# Patient Record
Sex: FEMALE | Race: OTHER | Employment: OTHER
[De-identification: names, ages, dates, MRNs, and addresses within clinical notes are randomized per-mention and may not be internally consistent; named-entity substitution may affect disease eponyms.]

---

## 2023-05-24 ENCOUNTER — APPOINTMENT (OUTPATIENT)
Facility: HOSPITAL | Age: 23
End: 2023-05-24
Payer: OTHER GOVERNMENT

## 2023-05-24 ENCOUNTER — HOSPITAL ENCOUNTER (INPATIENT)
Facility: HOSPITAL | Age: 23
LOS: 2 days | Discharge: HOME | End: 2023-05-26
Attending: EMERGENCY MEDICINE | Admitting: FAMILY MEDICINE
Payer: OTHER GOVERNMENT

## 2023-05-24 DIAGNOSIS — H46.9 OPTIC NEURITIS: Primary | ICD-10-CM

## 2023-05-24 PROBLEM — H54.62 VISION LOSS OF LEFT EYE: Status: ACTIVE | Noted: 2023-05-24

## 2023-05-24 LAB
ALBUMIN SERPL-MCNC: 4 G/DL (ref 3.5–5)
ALBUMIN/GLOB SERPL: 1.2 (ref 1.1–2.2)
ALP SERPL-CCNC: 48 U/L (ref 45–117)
ALT SERPL-CCNC: 18 U/L (ref 12–78)
ANION GAP SERPL CALC-SCNC: 5 MMOL/L (ref 5–15)
AST SERPL-CCNC: 9 U/L (ref 15–37)
BILIRUB SERPL-MCNC: 0.1 MG/DL (ref 0.2–1)
BUN SERPL-MCNC: 13 MG/DL (ref 6–20)
BUN/CREAT SERPL: 18 (ref 12–20)
CALCIUM SERPL-MCNC: 8.9 MG/DL (ref 8.5–10.1)
CHLORIDE SERPL-SCNC: 108 MMOL/L (ref 97–108)
CO2 SERPL-SCNC: 26 MMOL/L (ref 21–32)
COMMENT:: NORMAL
CREAT SERPL-MCNC: 0.73 MG/DL (ref 0.55–1.02)
ERYTHROCYTE [DISTWIDTH] IN BLOOD BY AUTOMATED COUNT: 12.6 % (ref 11.5–14.5)
GLOBULIN SER CALC-MCNC: 3.3 G/DL (ref 2–4)
GLUCOSE SERPL-MCNC: 96 MG/DL (ref 65–100)
HCG SERPL QL: NEGATIVE
HCG UR QL: NEGATIVE
HCT VFR BLD AUTO: 41.2 % (ref 35–47)
HGB BLD-MCNC: 13.4 G/DL (ref 11.5–16)
MCH RBC QN AUTO: 30 PG (ref 26–34)
MCHC RBC AUTO-ENTMCNC: 32.5 G/DL (ref 30–36.5)
MCV RBC AUTO: 92.2 FL (ref 80–99)
NRBC # BLD: 0 K/UL (ref 0–0.01)
NRBC BLD-RTO: 0 PER 100 WBC
PLATELET # BLD AUTO: 313 K/UL (ref 150–400)
PMV BLD AUTO: 9.2 FL (ref 8.9–12.9)
POTASSIUM SERPL-SCNC: 3.8 MMOL/L (ref 3.5–5.1)
PROT SERPL-MCNC: 7.3 G/DL (ref 6.4–8.2)
RBC # BLD AUTO: 4.47 M/UL (ref 3.8–5.2)
SODIUM SERPL-SCNC: 139 MMOL/L (ref 136–145)
SPECIMEN HOLD: NORMAL
WBC # BLD AUTO: 7.4 K/UL (ref 3.6–11)

## 2023-05-24 PROCEDURE — 85027 COMPLETE CBC AUTOMATED: CPT

## 2023-05-24 PROCEDURE — 99285 EMERGENCY DEPT VISIT HI MDM: CPT

## 2023-05-24 PROCEDURE — 6360000004 HC RX CONTRAST MEDICATION

## 2023-05-24 PROCEDURE — 96374 THER/PROPH/DIAG INJ IV PUSH: CPT

## 2023-05-24 PROCEDURE — 36415 COLL VENOUS BLD VENIPUNCTURE: CPT

## 2023-05-24 PROCEDURE — 80053 COMPREHEN METABOLIC PANEL: CPT

## 2023-05-24 PROCEDURE — 1100000003 HC PRIVATE W/ TELEMETRY

## 2023-05-24 PROCEDURE — 2580000003 HC RX 258: Performed by: EMERGENCY MEDICINE

## 2023-05-24 PROCEDURE — 70553 MRI BRAIN STEM W/O & W/DYE: CPT

## 2023-05-24 PROCEDURE — 6360000002 HC RX W HCPCS: Performed by: EMERGENCY MEDICINE

## 2023-05-24 PROCEDURE — 84703 CHORIONIC GONADOTROPIN ASSAY: CPT

## 2023-05-24 PROCEDURE — 81025 URINE PREGNANCY TEST: CPT

## 2023-05-24 PROCEDURE — A9579 GAD-BASE MR CONTRAST NOS,1ML: HCPCS

## 2023-05-24 RX ORDER — POLYETHYLENE GLYCOL 3350 17 G/17G
17 POWDER, FOR SOLUTION ORAL DAILY PRN
Status: DISCONTINUED | OUTPATIENT
Start: 2023-05-24 | End: 2023-05-27 | Stop reason: HOSPADM

## 2023-05-24 RX ORDER — ONDANSETRON 4 MG/1
4 TABLET, ORALLY DISINTEGRATING ORAL EVERY 8 HOURS PRN
Status: DISCONTINUED | OUTPATIENT
Start: 2023-05-24 | End: 2023-05-27 | Stop reason: HOSPADM

## 2023-05-24 RX ORDER — ENOXAPARIN SODIUM 100 MG/ML
40 INJECTION SUBCUTANEOUS DAILY
Status: DISCONTINUED | OUTPATIENT
Start: 2023-05-25 | End: 2023-05-24

## 2023-05-24 RX ORDER — ONDANSETRON 2 MG/ML
4 INJECTION INTRAMUSCULAR; INTRAVENOUS EVERY 6 HOURS PRN
Status: DISCONTINUED | OUTPATIENT
Start: 2023-05-24 | End: 2023-05-27 | Stop reason: HOSPADM

## 2023-05-24 RX ADMIN — GADOTERIDOL 10 ML: 279.3 INJECTION, SOLUTION INTRAVENOUS at 21:03

## 2023-05-24 RX ADMIN — SODIUM CHLORIDE 1000 MG: 900 INJECTION INTRAVENOUS at 21:53

## 2023-05-24 ASSESSMENT — ENCOUNTER SYMPTOMS
NAUSEA: 0
SHORTNESS OF BREATH: 0
DIARRHEA: 0
ABDOMINAL PAIN: 0
COUGH: 0
ABDOMINAL DISTENTION: 0
ANAL BLEEDING: 0
VOMITING: 0
BLOOD IN STOOL: 0

## 2023-05-24 ASSESSMENT — PAIN - FUNCTIONAL ASSESSMENT: PAIN_FUNCTIONAL_ASSESSMENT: 0-10

## 2023-05-24 ASSESSMENT — PAIN SCALES - GENERAL
PAINLEVEL_OUTOF10: 6
PAINLEVEL_OUTOF10: 7

## 2023-05-24 ASSESSMENT — PAIN DESCRIPTION - DESCRIPTORS: DESCRIPTORS: ACHING

## 2023-05-24 ASSESSMENT — PAIN DESCRIPTION - LOCATION
LOCATION: HEAD
LOCATION: HEAD

## 2023-05-24 NOTE — ED TRIAGE NOTES
Patient presents from home with complaints of migraines and vision loss out of her left eye for the last 3 weeks.  Patient was seen at the [facility] and sent here

## 2023-05-24 NOTE — ED PROVIDER NOTES
[facility] [place] EM[facility]GENCY DEP  EMERGENCY DEPARTMENT ENCOUNTER      Pt Name: Shelton Carroll  MRN: [id number]  Kellygfconnie 2000  Date of evaluation: 5/24/2023  Provider: Héctor Sprague MD    CHIEF COMPLAINT       Chief Complaint   Patient presents with    Loss of Vision         HISTORY OF PRESENT ILLNESS   (Location/Symptom, Timing/Onset, Context/Setting, Quality, Duration, Modifying Factors, Severity)  Note limiting factors. 23F w/ no known pmhx p/w 3wks HA and vision changes. Pt sent to ED after seeing [facility] today and c/f optic neuritis. Pt reports 3wks intermittent left sided HA and 2wks of left eye pain and vision changes. She initially reports near complete left eye vision loss that significantly improved since then but not yet returned to baseline. Had full opthalmology evaluation today and sent to ED c/f new onset MS and left optic neuritis w/ specific recommendations. Pt now denies any HA. No F/C, cough, dyspnea. No N/V/D. No speech changes, difficulty breathing/swallowing. No ext weakness or sensory changes or gait problems. Review of External Medical Records:     Nursing Notes were reviewed. REVIEW OF SYSTEMS    (2-9 systems for level 4, 10 or more for level 5)     Review of Systems   Constitutional:  Negative for diaphoresis and fever. HENT:  Negative for nosebleeds. Eyes:  Positive for visual disturbance. Respiratory:  Negative for cough and shortness of breath. Cardiovascular:  Negative for chest pain, palpitations and leg swelling. Gastrointestinal:  Negative for abdominal distention, abdominal pain, anal bleeding, blood in stool, diarrhea, nausea and vomiting. Endocrine: Negative for polyuria. Genitourinary:  Negative for difficulty urinating, dysuria, frequency and hematuria. Musculoskeletal:  Negative for joint swelling. Skin:  Negative for wound. Allergic/Immunologic: Negative for immunocompromised state. Neurological:  Positive for headaches.

## 2023-05-25 PROBLEM — H46.9 OPTIC NEURITIS: Status: ACTIVE | Noted: 2023-05-25

## 2023-05-25 LAB
CHOLEST SERPL-MCNC: 151 MG/DL
COMMENT:: NORMAL
CRP SERPL-MCNC: <0.29 MG/DL (ref 0–0.6)
ERYTHROCYTE [DISTWIDTH] IN BLOOD BY AUTOMATED COUNT: 12.5 % (ref 11.5–14.5)
ERYTHROCYTE [SEDIMENTATION RATE] IN BLOOD: 7 MM/HR (ref 0–20)
EST. AVERAGE GLUCOSE BLD GHB EST-MCNC: 91 MG/DL
HBA1C MFR BLD: 4.8 % (ref 4–5.6)
HCT VFR BLD AUTO: 39.7 % (ref 35–47)
HDLC SERPL-MCNC: 54 MG/DL
HDLC SERPL: 2.8 (ref 0–5)
HGB BLD-MCNC: 13.1 G/DL (ref 11.5–16)
LDLC SERPL CALC-MCNC: 90.8 MG/DL (ref 0–100)
MCH RBC QN AUTO: 29.9 PG (ref 26–34)
MCHC RBC AUTO-ENTMCNC: 33 G/DL (ref 30–36.5)
MCV RBC AUTO: 90.6 FL (ref 80–99)
NRBC # BLD: 0 K/UL (ref 0–0.01)
NRBC BLD-RTO: 0 PER 100 WBC
PLATELET # BLD AUTO: 291 K/UL (ref 150–400)
PMV BLD AUTO: 9.5 FL (ref 8.9–12.9)
RBC # BLD AUTO: 4.38 M/UL (ref 3.8–5.2)
SPECIMEN HOLD: NORMAL
TRIGL SERPL-MCNC: 31 MG/DL
VLDLC SERPL CALC-MCNC: 6.2 MG/DL
WBC # BLD AUTO: 12.3 K/UL (ref 3.6–11)

## 2023-05-25 PROCEDURE — 97161 PT EVAL LOW COMPLEX 20 MIN: CPT

## 2023-05-25 PROCEDURE — 83516 IMMUNOASSAY NONANTIBODY: CPT

## 2023-05-25 PROCEDURE — 99254 IP/OBS CNSLTJ NEW/EST MOD 60: CPT | Performed by: PSYCHIATRY & NEUROLOGY

## 2023-05-25 PROCEDURE — 6360000002 HC RX W HCPCS: Performed by: FAMILY MEDICINE

## 2023-05-25 PROCEDURE — 83036 HEMOGLOBIN GLYCOSYLATED A1C: CPT

## 2023-05-25 PROCEDURE — 86225 DNA ANTIBODY NATIVE: CPT

## 2023-05-25 PROCEDURE — 85027 COMPLETE CBC AUTOMATED: CPT

## 2023-05-25 PROCEDURE — 97165 OT EVAL LOW COMPLEX 30 MIN: CPT

## 2023-05-25 PROCEDURE — 85652 RBC SED RATE AUTOMATED: CPT

## 2023-05-25 PROCEDURE — 86235 NUCLEAR ANTIGEN ANTIBODY: CPT

## 2023-05-25 PROCEDURE — 80061 LIPID PANEL: CPT

## 2023-05-25 PROCEDURE — 2580000003 HC RX 258: Performed by: FAMILY MEDICINE

## 2023-05-25 PROCEDURE — 86140 C-REACTIVE PROTEIN: CPT

## 2023-05-25 PROCEDURE — APPNB45 APP NON BILLABLE 31-45 MINUTES

## 2023-05-25 PROCEDURE — 36415 COLL VENOUS BLD VENIPUNCTURE: CPT

## 2023-05-25 PROCEDURE — 1100000003 HC PRIVATE W/ TELEMETRY

## 2023-05-25 RX ADMIN — SODIUM CHLORIDE 1000 MG: 900 INJECTION INTRAVENOUS at 21:30

## 2023-05-25 ASSESSMENT — PAIN - FUNCTIONAL ASSESSMENT
PAIN_FUNCTIONAL_ASSESSMENT: NONE - DENIES PAIN
PAIN_FUNCTIONAL_ASSESSMENT: NONE - DENIES PAIN

## 2023-05-25 ASSESSMENT — LIFESTYLE VARIABLES
HOW MANY STANDARD DRINKS CONTAINING ALCOHOL DO YOU HAVE ON A TYPICAL DAY: 1 OR 2
HOW OFTEN DO YOU HAVE A DRINK CONTAINING ALCOHOL: MONTHLY OR LESS

## 2023-05-25 ASSESSMENT — PAIN SCALES - GENERAL
PAINLEVEL_OUTOF10: 0

## 2023-05-25 NOTE — H&P
Social History:     Social Determinants of Health     Tobacco Use: Not on file   Alcohol Use: Not on file   Financial Resource Strain: Not on file   Food Insecurity: Not on file   Transportation Needs: Not on file   Physical Activity: Not on file   Stress: Not on file   Social Connections: Not on file   Intimate Partner Violence: Not on file   Depression: Not on file   Housing Stability: Not on file        Medications were reconciled to the best of my ability given all available resources at the time of admission. Route is PO if not otherwise noted. Family and social history were personally reviewed, all pertinent and relevant details are outlined as above.     Objective:   /80   Pulse 80   Temp 97.8 °F (36.6 °C) (Oral)   Resp 20   Wt 57.3 kg (126 lb 5.2 oz)   SpO2 98%         PHYSICAL EXAM:   General: Alert x oriented x 3, awake, no acute distress,   HEENT: PEERL, EOMI, moist mucus membranes  Neck: Supple, no JVD, no meningeal signs  Chest: Clear to auscultation bilaterally   CVS: RRR, S1 S2 heard, no murmurs/rubs/gallops  Abd: Soft, non-tender, non-distended, +bowel sounds   Ext: No clubbing, no cyanosis, no edema  Neuro/Psych: Pleasant mood and affect, CN 2-12 grossly intact, sensory grossly within normal limit, Strength 5/5 in all extremities, DTR 1+ x 4  Cap refill: Brisk, less than 3 seconds  Pulses: 2+, symmetric in all extremities  Skin: Warm, dry, without rashes or lesions    Data Review:   I have independently reviewed and interpreted patient's lab and all other diagnostic data    Abnormal Labs Reviewed   COMPREHENSIVE METABOLIC PANEL - Abnormal; Notable for the following components:       Result Value    Total Bilirubin 0.1 (*)     AST 9 (*)     All other components within normal limits       [unfilled]    IMAGING:   MRI BRAIN W WO CONTRAST    (Results Pending)        ECG/ECHO:  [unfilled]       Notes reviewed from all clinical/nonclinical/nursing services involved in patient's

## 2023-05-25 NOTE — ED NOTES
Report given to [facility] RN from [facility].      Morelia Camarillo RN  05/25/23 1954

## 2023-05-25 NOTE — ED NOTES
Attempt to call report, room not clean yet.      [facility]  05/25/23 0095

## 2023-05-25 NOTE — CONSULTS
NeuroInterventional Surgery Consult  [facility] - [facility]    Patient: Alix Garzon MRN: [id number]  SSN: [id number]    YOB: 2000  Age: 26 y.o. Sex: female      Chief Complaint: Left eye vision loss, headache    HPI:   26 y.o. R-H Hispanic / [address]  female w/ no significant pmh to report presented to [address] [place] [address] ED on 5/24/23 for intermittent left visual disturbance, including complete left eye vision loss as well as headache. She reports that symptoms began apprx three weeks ago. The pt is in the [employer] and is a part of the [place] [employer]. She saw an ophthalmologist at [facility] who advised that she go to the ED for treatment w/ suspicion of MS vs optic neuritis. Labs were unremarkable upon presentation; Vitals - temp 98.3F, HR 88, /84, RR 16, Sp02 97% on RA. MRI brain performed showing appearance of inflamed left optic nerve and no white matter lesions suggestive of MS. Pt was started on methylprednisolone 1000mg IV. Neurology has been consulted for evaluation of left visual disturbance and headache w/ evidence suggesting optic neuritis. No past medical history on file. Family History  Mother - Hypertension    Social History     Tobacco Use    Smoking status: Denies    Smokeless tobacco: Vaping, daily   Substance Use Topics    Alcohol use: Socially, monthly      [unfilled]    No Known Allergies    Review of Systems:  11pt ROS negative except for above    Subjective:  Pt reports improvement in left visual disturbance. She now says it appears her left vision is nearly back to baseline except for sensation like \"a scratch on my glasses\". She reports 2/10 dull posterior headache. She denies dizziness, shortness of breath, numbness or tingling, nausea, or chest pain.     Objective:     Vitals:    05/25/23 1724   BP: 136/87   Pulse: 81   Resp: 20   Temp: 98.2 °F (36.8 °C)   SpO2: 95%     Physical Exam:  GENERAL: Calm, cooperative, NAD  SKIN: Warm, dry, color

## 2023-05-26 PROCEDURE — 2580000003 HC RX 258: Performed by: FAMILY MEDICINE

## 2023-05-26 PROCEDURE — 1100000003 HC PRIVATE W/ TELEMETRY

## 2023-05-26 PROCEDURE — 6360000002 HC RX W HCPCS: Performed by: FAMILY MEDICINE

## 2023-05-26 PROCEDURE — 99232 SBSQ HOSP IP/OBS MODERATE 35: CPT | Performed by: PSYCHIATRY & NEUROLOGY

## 2023-05-26 RX ORDER — FAMOTIDINE 20 MG/1
20 TABLET, FILM COATED ORAL 2 TIMES DAILY
Qty: 10 TABLET | Refills: 0 | Status: SHIPPED | OUTPATIENT
Start: 2023-05-26

## 2023-05-26 RX ORDER — PREDNISONE 20 MG/1
60 TABLET ORAL DAILY
Qty: 15 TABLET | Refills: 0 | Status: SHIPPED | OUTPATIENT
Start: 2023-05-26 | End: 2023-05-31

## 2023-05-26 RX ADMIN — SODIUM CHLORIDE 1000 MG: 900 INJECTION INTRAVENOUS at 21:33

## 2023-05-26 ASSESSMENT — PAIN SCALES - GENERAL
PAINLEVEL_OUTOF10: 0

## 2023-05-26 NOTE — ED NOTES
Bedside and Verbal shift change report given to RN Morelia Rice (oncoming nurse) by RN Dede Vargas (offgoing nurse). Report included the following information Nurse Handoff Report, Index, ED Encounter Summary, ED SBAR, Intake/Output, MAR, Recent Results, Med Rec Status, Cardiac Rhythm  , and Neuro Assessment.         [facility]  05/25/23 2024

## 2023-05-26 NOTE — DISCHARGE INSTRUCTIONS
Discharge Instructions       PATIENT ID: Deyanne Castleman  MRN: [id number]   YOB: 2000    DATE OF ADMISSION: [unfilled]    DATE OF DISCHARGE: 5/26/2023    PRIMARY CARE PROVIDER: @PCP@     ATTENDING PHYSICIAN: [unfilled]  DISCHARGING PROVIDER: Doyle Torrez MD    To contact this individual call [phone] and ask the  to page. If unavailable ask to be transferred the Adult Hospitalist Department. DISCHARGE DIAGNOSES Optic neuritis    CONSULTATIONS: Neurology    PROCEDURES/SURGERIES: * No surgery found *    PENDING TEST RESULTS:   At the time of discharge the following test results are still pending: none    FOLLOW UP APPOINTMENTS:   PCP  Neurology    ADDITIONAL CARE RECOMMENDATIONS: none    DIET: regular diet    ACTIVITY: activity as tolerated      DISCHARGE MEDICATIONS:   See Medication Reconciliation Form    It is important that you take the medication exactly as they are prescribed. Keep your medication in the bottles provided by the pharmacist and keep a list of the medication names, dosages, and times to be taken in your wallet. Do not take other medications without consulting your doctor. NOTIFY YOUR PHYSICIAN FOR ANY OF THE FOLLOWING:   Fever over 101 degrees for 24 hours. Chest pain, shortness of breath, fever, chills, nausea, vomiting, diarrhea, change in mentation, falling, weakness, bleeding. Severe pain or pain not relieved by medications. Or, any other signs or symptoms that you may have questions about.       DISPOSITION:  x  Home With:   OT  PT  HH  RN       SNF/Inpatient Rehab/LTAC    Independent/assisted living    Hospice    Other:     CDMP Checked:   Yes x     PROBLEM LIST Updated:  Yes x       Signed:   Doyle Torrez MD  5/26/2023  7:41 AM

## 2023-05-26 NOTE — PLAN OF CARE
Problem: Discharge Planning  Goal: Discharge to home or other facility with appropriate resources  Outcome: Progressing  Flowsheets (Taken 5/26/2023 0111)  Discharge to home or other facility with appropriate resources: Identify barriers to discharge with patient and caregiver     Problem: Safety - Adult  Goal: Free from fall injury  Outcome: Progressing     Problem: Safety - Adult  Goal: Free from fall injury  Outcome: Progressing

## 2023-05-26 NOTE — ED NOTES
TRANSFER - OUT REPORT:    Verbal report given to [address] [place] [address] [facility]  [facility] to [facility] for routine progression of patient care       Report consisted of patient's Situation, Background, Assessment and   Recommendations(SBAR). Information from the following report(s) Nurse Handoff Report, Index, ED SBAR, MAR, and Recent Results was reviewed with the receiving nurse. [place] Assessment: Presents to emergency department  because of falls (Syncope, seizure, or loss of consciousness): No, Age > 79: No, Altered Mental Status, Intoxication with alcohol or substance confusion (Disorientation, impaired judgment, poor safety awaremess, or inability to follow instructions): No, Impaired Mobility: Ambulates or transfers with assistive devices or assistance; Unable to ambulate or transer.: No, Nursing Judgement: No  Lines:   Peripheral IV 05/24/23 Right Antecubital (Active)   Site Assessment Clean, dry & intact 05/24/23 1557   Line Status Blood return noted; Flushed 05/24/23 1557   Line Care Chlorhexidine wipes 05/24/23 1557   Phlebitis Assessment No symptoms 05/24/23 1557   Infiltration Assessment 0 05/24/23 1557   Alcohol Cap Used No 05/24/23 1557   Dressing Status New dressing applied 05/24/23 1557   Dressing Type Transparent 05/24/23 1557   Dressing Intervention New 05/24/23 1557        Opportunity for questions and clarification was provided.       Patient transported with:  Soto Perry RN  05/25/23 3672

## 2023-05-26 NOTE — DISCHARGE SUMMARY
Discharge Summary       PATIENT ID: Sunita Guthrie  MRN: [id number]   YOB: 2000    DATE OF ADMISSION: 5/24/2023  6:14 PM    DATE OF DISCHARGE: 5/26/2023   PRIMARY CARE PROVIDER: No primary care provider on file. ATTENDING PHYSICIAN: Dr Idalia Harris  DISCHARGING PROVIDER: Idalia Harris MD    To contact this individual call [phone] and ask the  to page. If unavailable ask to be transferred the Adult Hospitalist Department. CONSULTATIONS: IP CONSULT TO NEUROLOGY  IP CONSULT TO NEUROLOGY    PROCEDURES/SURGERIES: * No surgery found *    ADMITTING DIAGNOSES & HOSPITAL COURSE:   Left optic neuritis  Left vision loss--improving  Headache--resolved  -MRI brain with and without IV contrast 5/24/2023:  swelling with STIR hyperintensity in the intraorbital left optic nerve and associated enhancement;   Findings consistent with left optic neuritis  -Continue solumedrol 1000 mg grams IV daily  -Already seen by ophthalmologist with complete eye exam, has an outpatient appointment  -[address] [place] [address] Neurology, will receive 3rd dose of solumedrol tonight and then discharge with outpatient for 5 days      Vaping nicotine dependence  -encourage vaping cessation    PENDING TEST RESULTS:   At the time of discharge the following test results are still pending: none    FOLLOW UP APPOINTMENTS:    PCP  Neurology    ADDITIONAL CARE RECOMMENDATIONS: as above    DIET: regular diet    ACTIVITY: activity as tolerated      DISCHARGE MEDICATIONS:     Medication List        START taking these medications      famotidine 20 MG tablet  Commonly known as: PEPCID  Take 1 tablet by mouth 2 times daily     predniSONE 20 MG tablet  Commonly known as: DELTASONE  Take 3 tablets by mouth daily for 5 days               Where to Get Your Medications        Information about where to get these medications is not yet available    Ask your nurse or doctor about these medications  famotidine 20 MG tablet  predniSONE 20 MG tablet

## 2023-05-26 NOTE — PLAN OF CARE
Problem: Discharge Planning  Goal: Discharge to home or other facility with appropriate resources  5/26/2023 1145 by Fatoumata Schmidt RN  Outcome: Progressing  5/26/2023 1145 by Fatoumata Schmidt RN  Outcome: Progressing  5/26/2023 0422 by Prasanna Salomon RN  Outcome: Progressing  Flowsheets (Taken 5/26/2023 0111)  Discharge to home or other facility with appropriate resources: Identify barriers to discharge with patient and caregiver     Problem: Safety - Adult  Goal: Free from fall injury  5/26/2023 1145 by Fatoumata Schmidt RN  Outcome: Progressing  5/26/2023 1145 by Fatoumata Schmidt RN  Outcome: Progressing  5/26/2023 0422 by Prasanna Salomon RN  Outcome: Progressing     Problem: Discharge Planning  Goal: Discharge to home or other facility with appropriate resources  5/26/2023 1145 by Fatoumata Schmidt RN  Outcome: Progressing  5/26/2023 1145 by Fatoumata Schmidt RN  Outcome: Progressing  5/26/2023 1145 by Fatoumata Schmidt RN  Outcome: Progressing  5/26/2023 0422 by Prasanna Salomon RN  Outcome: Progressing  Flowsheets (Taken 5/26/2023 0111)  Discharge to home or other facility with appropriate resources: Identify barriers to discharge with patient and caregiver     Problem: Safety - Adult  Goal: Free from fall injury  5/26/2023 1145 by Fatoumata Schmidt RN  Outcome: Progressing  5/26/2023 1145 by Fatoumata Schmidt RN  Outcome: Progressing  5/26/2023 1145 by Fatoumata Schmidt RN  Outcome: Progressing  5/26/2023 0422 by Prasanna Salomon RN  Outcome: Progressing     Problem: Pain  Goal: Verbalizes/displays adequate comfort level or baseline comfort level  5/26/2023 1145 by Fatoumata Schmidt RN  Outcome: Progressing  5/26/2023 1145 by Fatoumata Schmidt RN  Flowsheets (Taken 5/26/2023 1145)  Verbalizes/displays adequate comfort level or baseline comfort level: Assess pain using appropriate pain scale

## 2023-05-27 VITALS
HEIGHT: 60 IN | BODY MASS INDEX: 23.99 KG/M2 | SYSTOLIC BLOOD PRESSURE: 121 MMHG | WEIGHT: 122.2 LBS | HEART RATE: 57 BPM | RESPIRATION RATE: 17 BRPM | OXYGEN SATURATION: 99 % | DIASTOLIC BLOOD PRESSURE: 104 MMHG | TEMPERATURE: 98.4 F

## 2023-05-27 LAB
CENTROMERE B AB SER-ACNC: <0.2 AI (ref 0–0.9)
CHROMATIN AB SERPL-ACNC: <0.2 AI (ref 0–0.9)
DSDNA AB SER-ACNC: <1 IU/ML (ref 0–9)
ENA JO1 AB SER-ACNC: <0.2 AI (ref 0–0.9)
ENA RNP AB SER-ACNC: <0.2 AI (ref 0–0.9)
ENA SCL70 AB SER-ACNC: <0.2 AI (ref 0–0.9)
ENA SM AB SER-ACNC: <0.2 AI (ref 0–0.9)
ENA SM+RNP AB SER-ACNC: <0.2 AI (ref 0–0.9)
ENA SS-A AB SER-ACNC: <0.2 AI (ref 0–0.9)
ENA SS-B AB SER-ACNC: <0.2 AI (ref 0–0.9)
RIBOSOMAL P AB SER-ACNC: <0.2 AI (ref 0–0.9)
SEE BELOW:, 164879: NORMAL

## 2023-05-27 NOTE — PROGRESS NOTES
[facility] [facility]  Hospitalist Group                                                                                          Hospitalist Progress Note  Harjit Montgomery MD  Office Phone: [phone]        Date of Service:  2023  NAME:  Marlene Goss  :  2000  MRN:  [id number]       Admission Summary:   Marlene Goss is a 26 y.o. female with no significant past medical history presented to the department with chief complaint of vision loss of left eye and headaches. Patient notes onset of symptoms starting about 3 weeks ago. She complains of left-sided headaches. She complains of vision loss in the left eye which has been intermittent, severe with episode of near complete vision loss which improved but subsequently has not returned to baseline. She notes that she sees a \"film\" over her left eye. She denies any vision loss in the right eye. She does not complain of any diplopia/double vision. She was seen at [facility] by ophthalmologist, had full/complete eye examination and concern was for either new onset MS or left optic neuritis. Patient was referred to the ED for MRI and to start IV steroids. On arrival to the ED, initial recorded vital signs were temperature 98.3 °F, /84, heart rate 88, respiratory rate 16, O2 saturation 97% on room air. ED per recommendation is already started patient Solu-Medrol 1000 mg IV and order MRI of the brain. Patient is seen at the bedside for admission to the hospital service. There is no reports of slurred speech, facial droop, numbness, focal weakness, syncope, loss of consciousness, dizziness, chest pain, shortness of breath, abdominal pain, nausea, vomiting, falls, gait imbalance,, head, or neck trauma. Interval history / Subjective:    Follow up Optic neuritis  Feels slightly better   No headache/earache  No new issues     Assessment & Plan:     Left optic neuritis  Left vision
[address] [facility] Adult  Hospitalist Group                                                                                          Hospitalist Progress Note  Nivia Carias MD  Office Phone: [phone]        Date of Service:  2023  NAME:  Valerie Castillo  :  2000  MRN:  [id number]       Admission Summary:   Valerie Castillo is a 26 y.o. female with no significant past medical history presented to the department with chief complaint of vision loss of left eye and headaches. Patient notes onset of symptoms starting about 3 weeks ago. She complains of left-sided headaches. She complains of vision loss in the left eye which has been intermittent, severe with episode of near complete vision loss which improved but subsequently has not returned to baseline. She notes that she sees a \"film\" over her left eye. She denies any vision loss in the right eye. She does not complain of any diplopia/double vision. She was seen at [facility] by ophthalmologist, had full/complete eye examination and concern was for either new onset MS or left optic neuritis. Patient was referred to the ED for MRI and to start IV steroids. On arrival to the ED, initial recorded vital signs were temperature 98.3 °F, /84, heart rate 88, respiratory rate 16, O2 saturation 97% on room air. ED per recommendation is already started patient Solu-Medrol 1000 mg IV and order MRI of the brain. Patient is seen at the bedside for admission to the hospital service. There is no reports of slurred speech, facial droop, numbness, focal weakness, syncope, loss of consciousness, dizziness, chest pain, shortness of breath, abdominal pain, nausea, vomiting, falls, gait imbalance,, head, or neck trauma. Interval history / Subjective:    Follow up Optic neuritis  Feels slightly better   No headache/earache     Assessment & Plan:     Left optic neuritis  Left vision loss--improving  Headache--resolved  -MRI brain
Jayson Ragland is a 26 y.o. female admitted with left-sided optic neuritis. Her symptoms started about 3 weeks ago . Vision is still somewhat blurry but significantly improved. No other focal motor or sensory deficits on exam.     MRI brain is negative for any demyelinating lesions. EXAM  Exam:  /79   Pulse 96   Temp 97.8 °F (36.6 °C) (Oral)   Resp 23   Ht 5' (1.524 m)   Wt 122 lb 3.2 oz (55.4 kg)   SpO2 98%   BMI 23.87 kg/m²   Gen:  CV: RRR  Lungs: non labored breathing  Abd: non distending  Neuro: A&O x 3, no dysarthria or aphasia  CN II-XII: PERRL, EOMI, face symmetric, tongue/palate midline  Motor: strength 5/5 all four ext  Sensory: intact to LT  Gait: normal    LABS/ IMAGING  MRI Result (most recent):  MRI BRAIN W WO CONTRAST 05/24/2023    Narrative  EXAM:  MRI BRAIN W WO CONTRAST    INDICATION:    2-3wks HA and left eye vision changes; c/f optic neuritis    COMPARISON:  None. CONTRAST: 10 ml ProHance. TECHNIQUE:  Multiplanar multisequence acquisition without and with contrast of the brain and  orbits. FINDINGS:  There is swelling with STIR hyperintensity in the intraorbital left optic nerve  and associated enhancement. The right optic nerve is normal in size and signal.  The globes are normal. The extraocular muscles are normal. No evidence of  intraorbital mass. The ventricles are normal in size and position. The brain parenchyma has normal  signal characteristics. There is no acute infarct, hemorrhage, extra-axial fluid  collection, or mass effect. There is no cerebellar tonsillar herniation. Expected arterial flow-voids are present. No evidence of abnormal intracranial  enhancement. The paranasal sinuses, mastoid air cells, and middle ears are clear. No  significant osseous or scalp lesions are identified. Impression  1. Findings consistent with left optic neuritis. 2. Otherwise normal brain MRI.  Specifically, no white matter lesions
OCCUPATIONAL THERAPY EVALUATION/DISCHARGE  Patient: Jaimie Millard (19 y.o. female)  Date: 5/25/2023  Primary Diagnosis: Vision loss of left eye [H54.62]         Precautions:                    ASSESSMENT :  Based on the objective data below, the patient with good activity tolerance and participation, with intact B UE strength, coordination and sensation, complaint of L eye blurriness L of midline which is improving since admission and IV steroid. Patient educated on pacing and building strength and endurance with MD clearance especially with work and physical activity, educated on low vision strategies and safety. Patient independent with no physical or ADL needs. Functional Outcome Measure: The patient scored 24/24 on the [facility] outcome measure which is indicative of baseline. Further skilled acute occupational therapy is not indicated at this time. PLAN :  Recommend with staff: independent    Recommendation for discharge: (in order for the patient to meet his/her long term goals): No skilled occupational therapy    Other factors to consider for discharge: lives alone    IF patient discharges home will need the following DME: none       SUBJECTIVE:   Patient stated, \"It is already getting better since last night.     OBJECTIVE DATA SUMMARY:   No past medical history on file. No past surgical history on file.     Prior Level of Function/Environment/Context:   , ADL Assistance: Independent,  ,  ,  ,  ,  , Homemaking Assistance: Independent, Ambulation Assistance: Independent, Transfer Assistance: Independent, Active : Yes     Expanded or extensive additional review of patient history:   Lives With: Alone  Type of Home: Apartment     Home Access: Stairs to enter with rails        Bathroom Shower/Tub: Tub/Shower unit           Hand Dominance: right     EXAMINATION OF PERFORMANCE DEFICITS:    Cognitive/Behavioral Status:    Orientation  Orientation Level: Oriented X4       Skin: intact    Edema: none
Orders received, chart reviewed and patient evaluated by physical therapy, recommend:  No skilled physical therapy    Recommend with nursing OOB to chair 3x/day and walking daily. Thank you for completing as able in order to maintain patient strength, endurance and independence. Full evaluation to follow. Will sign off after documentation and safe to discharge from mobility standpoint.     Thank you for your consideration,    Maria Dolores Chilel, PT, DPT
Pt alert and oriented X 4. Vital signs stable. Independent. Pt stated that her vision turning normal and could read words. D/C order received. One more IV dose of Methylprednisolone administered. Remove IV access. Discharge education given. Prescription of PO med given to Pt. Pt accompanied by her friend left the unit at 2325 hrs.
Speech Therapy Contact Note    Chart reviewed. MRI Brain revealed L optic neuritis and otherwise normal brain MRI. During brief interview, patient denied swallowing or speech changes. Will complete order at this time; please re-consult as medically indicated.      Samira Kenny CCC-SLP
run.    OBJECTIVE DATA SUMMARY:   No past medical history on file. No past surgical history on file. Home Situation and Prior Level of Function: independent without AD/assist, works for [employer] in [employer]. Very active, does PT for her job. Drives in community. Social/Functional History  Lives With: Alone  Type of Home: Apartment  Home Access: Stairs to enter with rails  Entrance Stairs - Number of Steps: 2  Bathroom Shower/Tub: Tub/Shower unit  ADL Assistance: Independent  Homemaking Assistance: Independent  Ambulation Assistance: Independent  Transfer Assistance: Independent  Active : Yes  Occupation: Full time employment, [employer]  Critical Behavior:  Orientation  Orientation Level: Oriented X4       Hearing:   Hearing  Hearing: Within functional limits    Vision/Perceptual:    Vision - Basic Assessment  Prior Vision: Wears glasses all the time  Patient Visual Report: Blurring of vision when changing focal distance; Eye fatigue/eye pain/headache  Oculo Motor Control: WNL     Vision  Vision: Impaired  Vision Exceptions: Wears glasses at all times (blurred vision in L eye but still able to read distance)  Tracking: Able to track stimulus in all quads w/o difficulty (increased headache with tracking)       Strength:    Strength:  Within functional limits    Tone & Sensation:   Tone: Normal  Sensation: Intact    Coordination:  Coordination: Within functional limits    Range Of Motion:  AROM: Within functional limits  PROM: Within functional limits    Functional Mobility:  Bed Mobility:     Bed Mobility Training  Bed Mobility Training: Yes  Overall Level of Assistance: Independent  Rolling: Independent  Supine to Sit: Independent  Sit to Supine: Independent  Scooting: Independent  Transfers:     Transfer Training  Transfer Training: Yes  Overall Level of Assistance: Independent  Sit to Stand: Independent  Stand to Sit: Independent  Stand Pivot Transfers: Independent  Balance:        Balance  Sitting: